# Patient Record
Sex: FEMALE | Race: WHITE | NOT HISPANIC OR LATINO
[De-identification: names, ages, dates, MRNs, and addresses within clinical notes are randomized per-mention and may not be internally consistent; named-entity substitution may affect disease eponyms.]

---

## 2021-11-28 ENCOUNTER — NON-APPOINTMENT (OUTPATIENT)
Age: 61
End: 2021-11-28

## 2021-11-29 ENCOUNTER — TRANSCRIPTION ENCOUNTER (OUTPATIENT)
Age: 61
End: 2021-11-29

## 2021-11-29 ENCOUNTER — APPOINTMENT (OUTPATIENT)
Dept: OBGYN | Facility: CLINIC | Age: 61
End: 2021-11-29
Payer: MEDICARE

## 2021-11-29 VITALS
TEMPERATURE: 97.9 F | DIASTOLIC BLOOD PRESSURE: 72 MMHG | HEART RATE: 79 BPM | BODY MASS INDEX: 23 KG/M2 | HEIGHT: 62 IN | WEIGHT: 125 LBS | SYSTOLIC BLOOD PRESSURE: 132 MMHG

## 2021-11-29 DIAGNOSIS — Z87.19 PERSONAL HISTORY OF OTHER DISEASES OF THE DIGESTIVE SYSTEM: ICD-10-CM

## 2021-11-29 DIAGNOSIS — Z78.9 OTHER SPECIFIED HEALTH STATUS: ICD-10-CM

## 2021-11-29 DIAGNOSIS — Z63.4 DISAPPEARANCE AND DEATH OF FAMILY MEMBER: ICD-10-CM

## 2021-11-29 DIAGNOSIS — E04.1 NONTOXIC SINGLE THYROID NODULE: ICD-10-CM

## 2021-11-29 DIAGNOSIS — Z80.0 FAMILY HISTORY OF MALIGNANT NEOPLASM OF DIGESTIVE ORGANS: ICD-10-CM

## 2021-11-29 DIAGNOSIS — K57.90 DIVERTICULOSIS OF INTESTINE, PART UNSPECIFIED, W/OUT PERFORATION OR ABSCESS W/OUT BLEEDING: ICD-10-CM

## 2021-11-29 DIAGNOSIS — Z86.19 PERSONAL HISTORY OF OTHER INFECTIOUS AND PARASITIC DISEASES: ICD-10-CM

## 2021-11-29 DIAGNOSIS — Z82.49 FAMILY HISTORY OF ISCHEMIC HEART DISEASE AND OTHER DISEASES OF THE CIRCULATORY SYSTEM: ICD-10-CM

## 2021-11-29 DIAGNOSIS — Z87.09 PERSONAL HISTORY OF OTHER DISEASES OF THE RESPIRATORY SYSTEM: ICD-10-CM

## 2021-11-29 PROBLEM — Z00.00 ENCOUNTER FOR PREVENTIVE HEALTH EXAMINATION: Status: ACTIVE | Noted: 2021-11-29

## 2021-11-29 PROCEDURE — 99386 PREV VISIT NEW AGE 40-64: CPT

## 2021-11-29 SDOH — SOCIAL STABILITY - SOCIAL INSECURITY: DISSAPEARANCE AND DEATH OF FAMILY MEMBER: Z63.4

## 2021-11-30 PROBLEM — Z78.9 CONSUMES ALCOHOL OCCASIONALLY: Status: ACTIVE | Noted: 2021-11-29

## 2021-11-30 PROBLEM — Z78.9 DOES NOT USE ILLICIT DRUGS: Status: ACTIVE | Noted: 2021-11-29

## 2021-11-30 PROBLEM — E04.1: Status: RESOLVED | Noted: 2021-11-30 | Resolved: 2021-11-30

## 2021-11-30 PROBLEM — Z87.09 HISTORY OF ASTHMA: Status: RESOLVED | Noted: 2021-11-29 | Resolved: 2021-11-30

## 2021-11-30 PROBLEM — Z78.9 DOES NOT USE TOBACCO: Status: ACTIVE | Noted: 2021-11-29

## 2021-11-30 PROBLEM — Z78.9 EXERCISES OCCASIONALLY: Status: ACTIVE | Noted: 2021-11-29

## 2021-11-30 PROBLEM — Z86.19 HISTORY OF SHINGLES: Status: RESOLVED | Noted: 2021-11-30 | Resolved: 2021-11-30

## 2021-11-30 PROBLEM — Z80.0 FAMILY HISTORY OF COLORECTAL CANCER: Status: ACTIVE | Noted: 2021-11-30

## 2021-11-30 PROBLEM — Z80.0 FAMILY HISTORY OF PANCREATIC CANCER: Status: ACTIVE | Noted: 2021-11-29

## 2021-11-30 PROBLEM — Z63.4 WIDOW: Status: ACTIVE | Noted: 2021-11-29

## 2021-11-30 PROBLEM — Z82.49 FAMILY HISTORY OF CARDIAC DISORDER: Status: ACTIVE | Noted: 2021-11-29

## 2021-11-30 PROBLEM — Z87.19 HISTORY OF DIVERTICULITIS OF COLON: Status: RESOLVED | Noted: 2021-11-30 | Resolved: 2021-11-30

## 2021-11-30 PROBLEM — K57.90 DIVERTICULOSIS: Status: RESOLVED | Noted: 2021-11-30 | Resolved: 2021-11-30

## 2021-11-30 PROBLEM — Z87.19 HISTORY OF GASTROESOPHAGEAL REFLUX (GERD): Status: RESOLVED | Noted: 2021-11-29 | Resolved: 2021-11-30

## 2021-11-30 RX ORDER — LEVOFLOXACIN 750 MG/1
750 TABLET, FILM COATED ORAL
Qty: 10 | Refills: 0 | Status: COMPLETED | COMMUNITY
Start: 2021-07-15

## 2021-11-30 RX ORDER — CLARITHROMYCIN 500 MG/1
500 TABLET, FILM COATED ORAL
Qty: 20 | Refills: 0 | Status: COMPLETED | COMMUNITY
Start: 2021-07-16

## 2021-11-30 NOTE — PHYSICAL EXAM
[Appropriately responsive] : appropriately responsive [Alert] : alert [No Acute Distress] : no acute distress [No Lymphadenopathy] : no lymphadenopathy [Regular Rate Rhythm] : regular rate rhythm [Soft] : soft [Non-tender] : non-tender [Non-distended] : non-distended [No Mass] : no mass [Oriented x3] : oriented x3 [Examination Of The Breasts] : a normal appearance [No Discharge] : no discharge [No Masses] : no breast masses were palpable [Vulvar Atrophy] : vulvar atrophy [No Lesions] : no lesions  [Labia Majora] : normal [Labia Minora] : normal [Normal] : normal [Atrophy] : atrophy [No Bleeding] : There was no active vaginal bleeding [Tenderness] : nontender [Enlarged ___ wks] : not enlarged [Mass ___ cm] : no uterine mass was palpated [Uterine Adnexae] : normal [FreeTextEntry5] : pap smear done

## 2021-11-30 NOTE — HISTORY OF PRESENT ILLNESS
[Patient reported mammogram was normal] : Patient reported mammogram was normal [Patient reported PAP Smear was normal] : Patient reported PAP Smear was normal [Patient reported colonoscopy was normal] : Patient reported colonoscopy was normal [Gonorrhea test offered] : Gonorrhea test offered [Chlamydia test offered] : Chlamydia test offered [Trichomonas test offered] : Trichomonas test offered [HPV test offered] : HPV test offered [postmenopausal] : postmenopausal [N] : Patient is not sexually active [Currently In Menopause] : currently in menopause [Menopause Age: ____] : age at menopause was [unfilled] [Previously active] : previously active [Men] : men [No] : No [Patient reported bone density results were abnormal] : Patient reported bone density results were abnormal [TextBox_4] : 60yo  in menopause came for annual GYN exam and pap smear.  She denies PMB, pelvic pain, discharge, dysuria or other GYN complaints. She does c/o feeling abdominal bloating generalized, no weight loss or N/V.  She states she thinks she had an abnormal pap and remembers having a colposcopy maybe in 2012, but was told normal and has been normal since.  She denies other STDs, fibroids or cysts. She is not sexually active.\par \par She has a FHx of pancreatic and colorectal cancer and was counseled on hereditary cancer gene testing.  All questions answered satisfactorily at and she states she will think about it.   [Mammogramdate] : 9-2020 [TextBox_19] : will give referral [PapSmeardate] : 2018 [BoneDensityDate] : 6-2021 [TextBox_37] : osteoporosis [ColonoscopyDate] : 2018 [TextBox_43] : 5 yr followup [PGHxTotal] : 4 [[facility]] : 2 [PGHxPremature] : 0 [PGHxAbortions] : 2 [[facility]Living] : 2 [PGHxABInduced] : 0 [PGHxABSpont] : 2 [PGHxEctopic] : 0 [PGHxMultBirths] : 0 [FreeTextEntry1] :  x2

## 2021-12-03 LAB
C TRACH RRNA SPEC QL NAA+PROBE: NOT DETECTED
HPV HIGH+LOW RISK DNA PNL CVX: NOT DETECTED
N GONORRHOEA RRNA SPEC QL NAA+PROBE: NOT DETECTED
SOURCE AMPLIFICATION: NORMAL
SOURCE AMPLIFICATION: NORMAL
T VAGINALIS RRNA SPEC QL NAA+PROBE: NOT DETECTED

## 2021-12-13 LAB — CYTOLOGY CVX/VAG DOC THIN PREP: ABNORMAL

## 2022-01-07 ENCOUNTER — APPOINTMENT (OUTPATIENT)
Dept: OBGYN | Facility: CLINIC | Age: 62
End: 2022-01-07
Payer: MEDICARE

## 2022-01-07 PROCEDURE — 99442: CPT

## 2022-01-26 ENCOUNTER — NON-APPOINTMENT (OUTPATIENT)
Age: 62
End: 2022-01-26

## 2022-01-31 ENCOUNTER — OUTPATIENT (OUTPATIENT)
Dept: OUTPATIENT SERVICES | Facility: HOSPITAL | Age: 62
LOS: 1 days | Discharge: HOME | End: 2022-01-31
Payer: MEDICARE

## 2022-01-31 DIAGNOSIS — R60.0 LOCALIZED EDEMA: ICD-10-CM

## 2022-01-31 PROCEDURE — 93971 EXTREMITY STUDY: CPT | Mod: 26,RT

## 2022-02-01 ENCOUNTER — OUTPATIENT (OUTPATIENT)
Dept: OUTPATIENT SERVICES | Facility: HOSPITAL | Age: 62
LOS: 1 days | Discharge: HOME | End: 2022-02-01
Payer: MEDICARE

## 2022-02-01 VITALS
RESPIRATION RATE: 16 BRPM | HEIGHT: 62 IN | HEART RATE: 52 BPM | DIASTOLIC BLOOD PRESSURE: 68 MMHG | OXYGEN SATURATION: 97 % | TEMPERATURE: 97 F | SYSTOLIC BLOOD PRESSURE: 139 MMHG | WEIGHT: 125 LBS

## 2022-02-01 DIAGNOSIS — S92.909A UNSPECIFIED FRACTURE OF UNSPECIFIED FOOT, INITIAL ENCOUNTER FOR CLOSED FRACTURE: Chronic | ICD-10-CM

## 2022-02-01 DIAGNOSIS — R93.89 ABNORMAL FINDINGS ON DIAGNOSTIC IMAGING OF OTHER SPECIFIED BODY STRUCTURES: ICD-10-CM

## 2022-02-01 DIAGNOSIS — Z98.890 OTHER SPECIFIED POSTPROCEDURAL STATES: Chronic | ICD-10-CM

## 2022-02-01 DIAGNOSIS — Z01.818 ENCOUNTER FOR OTHER PREPROCEDURAL EXAMINATION: ICD-10-CM

## 2022-02-01 LAB
ALBUMIN SERPL ELPH-MCNC: 4.4 G/DL — SIGNIFICANT CHANGE UP (ref 3.5–5.2)
ALP SERPL-CCNC: 87 U/L — SIGNIFICANT CHANGE UP (ref 30–115)
ALT FLD-CCNC: 9 U/L — SIGNIFICANT CHANGE UP (ref 0–41)
ANION GAP SERPL CALC-SCNC: 14 MMOL/L — SIGNIFICANT CHANGE UP (ref 7–14)
APTT BLD: 30 SEC — SIGNIFICANT CHANGE UP (ref 27–39.2)
AST SERPL-CCNC: 14 U/L — SIGNIFICANT CHANGE UP (ref 0–41)
BASOPHILS # BLD AUTO: 0.01 K/UL — SIGNIFICANT CHANGE UP (ref 0–0.2)
BASOPHILS NFR BLD AUTO: 0.1 % — SIGNIFICANT CHANGE UP (ref 0–1)
BILIRUB SERPL-MCNC: 0.5 MG/DL — SIGNIFICANT CHANGE UP (ref 0.2–1.2)
BUN SERPL-MCNC: 11 MG/DL — SIGNIFICANT CHANGE UP (ref 10–20)
CALCIUM SERPL-MCNC: 9.2 MG/DL — SIGNIFICANT CHANGE UP (ref 8.5–10.1)
CHLORIDE SERPL-SCNC: 104 MMOL/L — SIGNIFICANT CHANGE UP (ref 98–110)
CO2 SERPL-SCNC: 24 MMOL/L — SIGNIFICANT CHANGE UP (ref 17–32)
CREAT SERPL-MCNC: 0.9 MG/DL — SIGNIFICANT CHANGE UP (ref 0.7–1.5)
EOSINOPHIL # BLD AUTO: 0.1 K/UL — SIGNIFICANT CHANGE UP (ref 0–0.7)
EOSINOPHIL NFR BLD AUTO: 1.3 % — SIGNIFICANT CHANGE UP (ref 0–8)
GLUCOSE SERPL-MCNC: 77 MG/DL — SIGNIFICANT CHANGE UP (ref 70–99)
HCT VFR BLD CALC: 36 % — LOW (ref 37–47)
HGB BLD-MCNC: 12.8 G/DL — SIGNIFICANT CHANGE UP (ref 12–16)
IMM GRANULOCYTES NFR BLD AUTO: 0.3 % — SIGNIFICANT CHANGE UP (ref 0.1–0.3)
INR BLD: 1.03 RATIO — SIGNIFICANT CHANGE UP (ref 0.65–1.3)
LYMPHOCYTES # BLD AUTO: 2.24 K/UL — SIGNIFICANT CHANGE UP (ref 1.2–3.4)
LYMPHOCYTES # BLD AUTO: 29.6 % — SIGNIFICANT CHANGE UP (ref 20.5–51.1)
MCHC RBC-ENTMCNC: 32.9 PG — HIGH (ref 27–31)
MCHC RBC-ENTMCNC: 35.6 G/DL — SIGNIFICANT CHANGE UP (ref 32–37)
MCV RBC AUTO: 92.5 FL — SIGNIFICANT CHANGE UP (ref 81–99)
MONOCYTES # BLD AUTO: 0.88 K/UL — HIGH (ref 0.1–0.6)
MONOCYTES NFR BLD AUTO: 11.6 % — HIGH (ref 1.7–9.3)
NEUTROPHILS # BLD AUTO: 4.31 K/UL — SIGNIFICANT CHANGE UP (ref 1.4–6.5)
NEUTROPHILS NFR BLD AUTO: 57.1 % — SIGNIFICANT CHANGE UP (ref 42.2–75.2)
NRBC # BLD: 0 /100 WBCS — SIGNIFICANT CHANGE UP (ref 0–0)
PLATELET # BLD AUTO: 254 K/UL — SIGNIFICANT CHANGE UP (ref 130–400)
POTASSIUM SERPL-MCNC: 3.6 MMOL/L — SIGNIFICANT CHANGE UP (ref 3.5–5)
POTASSIUM SERPL-SCNC: 3.6 MMOL/L — SIGNIFICANT CHANGE UP (ref 3.5–5)
PROT SERPL-MCNC: 6.6 G/DL — SIGNIFICANT CHANGE UP (ref 6–8)
PROTHROM AB SERPL-ACNC: 11.8 SEC — SIGNIFICANT CHANGE UP (ref 9.95–12.87)
RBC # BLD: 3.89 M/UL — LOW (ref 4.2–5.4)
RBC # FLD: 12.6 % — SIGNIFICANT CHANGE UP (ref 11.5–14.5)
SODIUM SERPL-SCNC: 142 MMOL/L — SIGNIFICANT CHANGE UP (ref 135–146)
T3 SERPL-MCNC: 205 NG/DL — HIGH (ref 80–200)
T4 AB SER-ACNC: 6.2 UG/DL — SIGNIFICANT CHANGE UP (ref 4.6–12)
TSH SERPL-MCNC: 16.24 UIU/ML — HIGH (ref 0.27–4.2)
WBC # BLD: 7.56 K/UL — SIGNIFICANT CHANGE UP (ref 4.8–10.8)
WBC # FLD AUTO: 7.56 K/UL — SIGNIFICANT CHANGE UP (ref 4.8–10.8)

## 2022-02-01 PROCEDURE — 93010 ELECTROCARDIOGRAM REPORT: CPT

## 2022-02-01 NOTE — H&P PST ADULT - NSICDXFAMILYHX_GEN_ALL_CORE_FT
FAMILY HISTORY:  Father  Still living? No  FH: CAD (coronary artery disease), Age at diagnosis: Age Unknown    Mother  Still living? No  FHx: pancreatic cancer, Age at diagnosis: Age Unknown

## 2022-02-01 NOTE — H&P PST ADULT - NSICDXPASTMEDICALHX_GEN_ALL_CORE_FT
PAST MEDICAL HISTORY:  Asthma stable, last pneumonia 2020    Cardiac murmur     Dystrophy, reflex sympathetic of upper limb (RSD)     Fibromyalgia     GERD (gastroesophageal reflux disease)     History of diverticulosis     HTN (hypertension)     Hyperthyroidism     Muscle tension dysphonia     OA (osteoarthritis)

## 2022-02-01 NOTE — H&P PST ADULT - BRAND OF FIRST COVID-19 BOOSTER
The patient has multiple chronic GI and non GI complaints.  The constellation of her GI symptoms are consistent with IBS.  Her left upper quadrant pain appears more musculoskeletal in nature and could be due to persistent issues with exercising and putting stress on this area from crunches, etc.  I also explained to her that chronic nausea is also a very vague symptoms that may or may not be related GI causes, however with that said given her iron deficiency anemia as well as her multiple symptoms I do believe that she warrants EGD and colonoscopy.  We will try to improve her constipation with fiber and MiraLax.  If this does not work in the next few weeks we can consider a trial of Trulance or Linzess.  She already has an extensive negative serologic evaluation so I will hold off on any more blood work at this time.  She had a negative abdominal ultrasound.  I do think dietary modification would be beneficial to her, especially stay away from certain foods that cause bloating and gas.  If the above workup is negative we may consider MRI of the brain given her multiple neurologic issues as this can also be associated with chronic nausea as well. Moderna

## 2022-02-01 NOTE — H&P PST ADULT - HISTORY OF PRESENT ILLNESS
60 yo female presents for PAST in preparation for Dilation and Curettage, Diagnostic Hysteroscopy  Pt complains of abnormal abdominal bloating and cramps. Pt had intravaginal sono with "thickened walls" , pt is post menopausal, denies vaginal bleeding or discharge.  Denies any chest pain, difficulty breathing, SOB, palpitations, dysuria, URI, or any other infections in the last 2 weeks/1 month. Denies any recent travel, contact, or exposure to any persons with known or suspected COVID-19. Pt also denies COVID testing within the last 2 weeks. Pt advised to self quarantine until day of procedure. Exercise tolerance of 2-3 flights of stairs without dyspnea. ROBIN reviewed with patient.  Anesthesia Alert  NO--Difficult Airway  NO--History of neck surgery or radiation  NO--Limited ROM of neck  NO--History of Malignant hyperthermia  NO--Personal or family history of Pseudocholinesterase deficiency.  NO--Prior Anesthesia Complication  NO--Latex Allergy?? adhesives   NO--Loose teeth  NO--History of Rheumatoid Arthritis  NO--ROBIN  NO--Bleeding risk  NO--Other_____   written and verbal instructions with teach back on chlorhexidine shampoo provided,  pt verbalized understanding with returned demonstration  Patient verbalized understanding of instructions and was given the opportunity to ask questions and have them answered.   60 yo female presents for PAST in preparation for Dilation and Curettage, Diagnostic Hysteroscopy  Pt complains of intermittent abnormal abdominal bloating and cramps  for months. Pain is rated as 5/10, pt would take Ibuprofen with relief.  Pt had intravaginal sono and diagnosed with "thickened walls" , pt is post menopausal, LMP was in last 40's.    denies vaginal bleeding or discharge.  Denies any chest pain, difficulty breathing, SOB, palpitations, dysuria, URI, or any other infections in the last 2 weeks/1 month. Denies any recent travel, contact, or exposure to any persons with known or suspected COVID-19. Pt also denies COVID testing within the last 2 weeks. Pt advised to self quarantine until day of procedure. Exercise tolerance of 2-3 flights of stairs without dyspnea. ROBIN reviewed with patient.  Anesthesia Alert  NO--Difficult Airway  NO--History of neck surgery or radiation  NO--Limited ROM of neck  NO--History of Malignant hyperthermia  NO--Personal or family history of Pseudocholinesterase deficiency.  NO--Prior Anesthesia Complication  NO--Latex Allergy?? adhesives   NO--Loose teeth  NO--History of Rheumatoid Arthritis  NO--ROBIN  NO--Bleeding risk  NO--Other_____   written and verbal instructions with teach back on chlorhexidine shampoo provided,  pt verbalized understanding with returned demonstration  Patient verbalized understanding of instructions and was given the opportunity to ask questions and have them answered.

## 2022-02-07 ENCOUNTER — NON-APPOINTMENT (OUTPATIENT)
Age: 62
End: 2022-02-07

## 2022-02-07 ENCOUNTER — LABORATORY RESULT (OUTPATIENT)
Age: 62
End: 2022-02-07

## 2022-02-08 ENCOUNTER — OUTPATIENT (OUTPATIENT)
Dept: OUTPATIENT SERVICES | Facility: HOSPITAL | Age: 62
LOS: 1 days | Discharge: HOME | End: 2022-02-08
Payer: MEDICARE

## 2022-02-08 DIAGNOSIS — S92.909A UNSPECIFIED FRACTURE OF UNSPECIFIED FOOT, INITIAL ENCOUNTER FOR CLOSED FRACTURE: Chronic | ICD-10-CM

## 2022-02-08 DIAGNOSIS — M79.671 PAIN IN RIGHT FOOT: ICD-10-CM

## 2022-02-08 DIAGNOSIS — M25.571 PAIN IN RIGHT ANKLE AND JOINTS OF RIGHT FOOT: ICD-10-CM

## 2022-02-08 DIAGNOSIS — Z98.890 OTHER SPECIFIED POSTPROCEDURAL STATES: Chronic | ICD-10-CM

## 2022-02-08 PROBLEM — K21.9 GASTRO-ESOPHAGEAL REFLUX DISEASE WITHOUT ESOPHAGITIS: Chronic | Status: ACTIVE | Noted: 2022-02-01

## 2022-02-08 PROBLEM — R01.1 CARDIAC MURMUR, UNSPECIFIED: Chronic | Status: ACTIVE | Noted: 2022-02-01

## 2022-02-08 PROBLEM — M79.7 FIBROMYALGIA: Chronic | Status: ACTIVE | Noted: 2022-02-01

## 2022-02-08 PROBLEM — R49.0 DYSPHONIA: Chronic | Status: ACTIVE | Noted: 2022-02-01

## 2022-02-08 PROBLEM — I10 ESSENTIAL (PRIMARY) HYPERTENSION: Chronic | Status: ACTIVE | Noted: 2022-02-01

## 2022-02-08 PROBLEM — E05.90 THYROTOXICOSIS, UNSPECIFIED WITHOUT THYROTOXIC CRISIS OR STORM: Chronic | Status: ACTIVE | Noted: 2022-02-01

## 2022-02-08 PROBLEM — Z87.19 PERSONAL HISTORY OF OTHER DISEASES OF THE DIGESTIVE SYSTEM: Chronic | Status: ACTIVE | Noted: 2022-02-01

## 2022-02-08 PROBLEM — M19.90 UNSPECIFIED OSTEOARTHRITIS, UNSPECIFIED SITE: Chronic | Status: ACTIVE | Noted: 2022-02-01

## 2022-02-08 PROCEDURE — 73630 X-RAY EXAM OF FOOT: CPT | Mod: 26,RT

## 2022-02-08 PROCEDURE — 73610 X-RAY EXAM OF ANKLE: CPT | Mod: 26,RT

## 2022-02-28 ENCOUNTER — APPOINTMENT (OUTPATIENT)
Dept: OBGYN | Facility: CLINIC | Age: 62
End: 2022-02-28

## 2022-03-03 ENCOUNTER — NON-APPOINTMENT (OUTPATIENT)
Age: 62
End: 2022-03-03

## 2022-03-10 ENCOUNTER — OUTPATIENT (OUTPATIENT)
Dept: OUTPATIENT SERVICES | Facility: HOSPITAL | Age: 62
LOS: 1 days | Discharge: HOME | End: 2022-03-10

## 2022-03-10 VITALS
SYSTOLIC BLOOD PRESSURE: 154 MMHG | RESPIRATION RATE: 17 BRPM | WEIGHT: 128.09 LBS | HEIGHT: 62 IN | HEART RATE: 56 BPM | DIASTOLIC BLOOD PRESSURE: 74 MMHG | OXYGEN SATURATION: 97 % | TEMPERATURE: 97 F

## 2022-03-10 DIAGNOSIS — R93.89 ABNORMAL FINDINGS ON DIAGNOSTIC IMAGING OF OTHER SPECIFIED BODY STRUCTURES: ICD-10-CM

## 2022-03-10 DIAGNOSIS — Z98.890 OTHER SPECIFIED POSTPROCEDURAL STATES: Chronic | ICD-10-CM

## 2022-03-10 DIAGNOSIS — Z01.818 ENCOUNTER FOR OTHER PREPROCEDURAL EXAMINATION: ICD-10-CM

## 2022-03-10 DIAGNOSIS — S92.909A UNSPECIFIED FRACTURE OF UNSPECIFIED FOOT, INITIAL ENCOUNTER FOR CLOSED FRACTURE: Chronic | ICD-10-CM

## 2022-03-10 LAB
ALBUMIN SERPL ELPH-MCNC: 4.3 G/DL — SIGNIFICANT CHANGE UP (ref 3.5–5.2)
ALP SERPL-CCNC: 82 U/L — SIGNIFICANT CHANGE UP (ref 30–115)
ALT FLD-CCNC: 16 U/L — SIGNIFICANT CHANGE UP (ref 0–41)
ANION GAP SERPL CALC-SCNC: 14 MMOL/L — SIGNIFICANT CHANGE UP (ref 7–14)
APTT BLD: 29.4 SEC — SIGNIFICANT CHANGE UP (ref 27–39.2)
AST SERPL-CCNC: 20 U/L — SIGNIFICANT CHANGE UP (ref 0–41)
BASOPHILS # BLD AUTO: 0 K/UL — SIGNIFICANT CHANGE UP (ref 0–0.2)
BASOPHILS NFR BLD AUTO: 0 % — SIGNIFICANT CHANGE UP (ref 0–1)
BILIRUB SERPL-MCNC: 0.2 MG/DL — SIGNIFICANT CHANGE UP (ref 0.2–1.2)
BUN SERPL-MCNC: 11 MG/DL — SIGNIFICANT CHANGE UP (ref 10–20)
CALCIUM SERPL-MCNC: 8.6 MG/DL — SIGNIFICANT CHANGE UP (ref 8.5–10.1)
CHLORIDE SERPL-SCNC: 101 MMOL/L — SIGNIFICANT CHANGE UP (ref 98–110)
CO2 SERPL-SCNC: 22 MMOL/L — SIGNIFICANT CHANGE UP (ref 17–32)
CREAT SERPL-MCNC: 1 MG/DL — SIGNIFICANT CHANGE UP (ref 0.7–1.5)
EGFR: 64 ML/MIN/1.73M2 — SIGNIFICANT CHANGE UP
EOSINOPHIL # BLD AUTO: 0.12 K/UL — SIGNIFICANT CHANGE UP (ref 0–0.7)
EOSINOPHIL NFR BLD AUTO: 1.7 % — SIGNIFICANT CHANGE UP (ref 0–8)
GLUCOSE SERPL-MCNC: 94 MG/DL — SIGNIFICANT CHANGE UP (ref 70–99)
HCT VFR BLD CALC: 34.3 % — LOW (ref 37–47)
HGB BLD-MCNC: 11.8 G/DL — LOW (ref 12–16)
IMM GRANULOCYTES NFR BLD AUTO: 0.4 % — HIGH (ref 0.1–0.3)
INR BLD: 1.07 RATIO — SIGNIFICANT CHANGE UP (ref 0.65–1.3)
LYMPHOCYTES # BLD AUTO: 2.21 K/UL — SIGNIFICANT CHANGE UP (ref 1.2–3.4)
LYMPHOCYTES # BLD AUTO: 30.4 % — SIGNIFICANT CHANGE UP (ref 20.5–51.1)
MCHC RBC-ENTMCNC: 32.3 PG — HIGH (ref 27–31)
MCHC RBC-ENTMCNC: 34.4 G/DL — SIGNIFICANT CHANGE UP (ref 32–37)
MCV RBC AUTO: 94 FL — SIGNIFICANT CHANGE UP (ref 81–99)
MONOCYTES # BLD AUTO: 0.73 K/UL — HIGH (ref 0.1–0.6)
MONOCYTES NFR BLD AUTO: 10.1 % — HIGH (ref 1.7–9.3)
NEUTROPHILS # BLD AUTO: 4.17 K/UL — SIGNIFICANT CHANGE UP (ref 1.4–6.5)
NEUTROPHILS NFR BLD AUTO: 57.4 % — SIGNIFICANT CHANGE UP (ref 42.2–75.2)
NRBC # BLD: 0 /100 WBCS — SIGNIFICANT CHANGE UP (ref 0–0)
PLATELET # BLD AUTO: 269 K/UL — SIGNIFICANT CHANGE UP (ref 130–400)
POTASSIUM SERPL-MCNC: 3.8 MMOL/L — SIGNIFICANT CHANGE UP (ref 3.5–5)
POTASSIUM SERPL-SCNC: 3.8 MMOL/L — SIGNIFICANT CHANGE UP (ref 3.5–5)
PROT SERPL-MCNC: 6.4 G/DL — SIGNIFICANT CHANGE UP (ref 6–8)
PROTHROM AB SERPL-ACNC: 12.3 SEC — SIGNIFICANT CHANGE UP (ref 9.95–12.87)
RBC # BLD: 3.65 M/UL — LOW (ref 4.2–5.4)
RBC # FLD: 12.2 % — SIGNIFICANT CHANGE UP (ref 11.5–14.5)
SODIUM SERPL-SCNC: 137 MMOL/L — SIGNIFICANT CHANGE UP (ref 135–146)
T3 SERPL-MCNC: 222 NG/DL — HIGH (ref 80–200)
T4 AB SER-ACNC: 10.5 UG/DL — SIGNIFICANT CHANGE UP (ref 4.6–12)
TSH SERPL-MCNC: 0.14 UIU/ML — LOW (ref 0.27–4.2)
WBC # BLD: 7.26 K/UL — SIGNIFICANT CHANGE UP (ref 4.8–10.8)
WBC # FLD AUTO: 7.26 K/UL — SIGNIFICANT CHANGE UP (ref 4.8–10.8)

## 2022-03-10 RX ORDER — CYCLOBENZAPRINE HYDROCHLORIDE 10 MG/1
0 TABLET, FILM COATED ORAL
Qty: 0 | Refills: 0 | DISCHARGE

## 2022-03-10 RX ORDER — MOMETASONE FUROATE 220 UG/1
0 INHALANT RESPIRATORY (INHALATION)
Qty: 0 | Refills: 0 | DISCHARGE

## 2022-03-10 RX ORDER — METHIMAZOLE 10 MG/1
1 TABLET ORAL
Qty: 0 | Refills: 0 | DISCHARGE

## 2022-03-10 RX ORDER — AMITRIPTYLINE HCL 25 MG
0 TABLET ORAL
Qty: 0 | Refills: 0 | DISCHARGE

## 2022-03-10 NOTE — H&P PST ADULT - ATTENDING COMMENTS
60yo P2 in menopause came for scheduled D&C hysteroscopy.  She has been having increased abdominal bloating and cramps for a few months and had pelvic sonogram that showed thickened endometrium with presumed polyp with some vascularity. She denies Pelvic pain or PMB.  We has discussed all of her options for mgmt, but she declined further work up and wanted more diagnostic and definitive mgmt with procedure. We previously and again today discussed R/B/A and patient wishes to proceed with procedure. She went for medical clearance and the records are in the chart.  This procedure was delayed after she had an infection that was being treated.  She is recovered and has no complaints today. Vitals stable.    Risks of surgery including but not limited to, hemorrhage, blood transfusion, uterine perforation, injury to bowel/bladder with repair, need for laparoscopy/laparotomy, cystoscopy, future procedures and readmission were discussed. Patient understood all counseling, all questions answered and informed witnessed consent was signed.     A: 60yo in menopause with thickened endometrium and possible polyp for D&C hysteroscopy    P: admit     NPO     IV hydration     medical clearance in chart      anesthesia consulted     venodyne compression     on call to OR     anticipate d/c home and 2 wk office follow up or PRN

## 2022-03-10 NOTE — H&P PST ADULT - REASON FOR ADMISSION
Case Type: OP Block TimeSuite: CASProceduralist: Rosalind Sanchez  Confirmed Surgery DateTime: [id number] - Procedure: Dilation and Curettage, Diagnostic Hysteroscopy  Laterality: N/ALength of Procedure: 60 MinutesAnesthesia Type: General

## 2022-03-10 NOTE — H&P PST ADULT - HISTORY OF PRESENT ILLNESS
62 yo female presents for PAST in preparation for Dilation and Curettage, Diagnostic Hysteroscopy  Pt complains of intermittent abnormal abdominal bloating and cramps  for months. Pain is rated as 5/10, pt would take Ibuprofen with relief.  Pt had intravaginal sono and diagnosed with "thickened walls", as well as a "structure" , pt is post menopausal, LMP was in last 40's.    denies vaginal bleeding or discharge.    SURGERY WAS POSTPONED, BECAUSE HER TFT'S IN PAST REVEALED SHE WAS HYPOTHYROID.    Denies any chest pain, difficulty breathing, SOB, palpitations, dysuria, URI, or any other infections in the last 2 weeks/1 month. Denies any recent travel, contact, or exposure to any persons with known or suspected COVID-19. Pt also denies COVID testing within the last 2 weeks. Pt advised to self quarantine until day of procedure. Exercise tolerance of 2-3 flights of stairs without dyspnea. ROBIN reviewed with patient.    Anesthesia Alert  NO--Difficult Airway  NO--History of neck surgery or radiation  NO--Limited ROM of neck  NO--History of Malignant hyperthermia  NO--Personal or family history of Pseudocholinesterase deficiency.  NO--Prior Anesthesia Complication  NO--Latex Allergy?? adhesives   NO--Loose teeth  NO--History of Rheumatoid Arthritis  NO--ROBIN  NO--Bleeding risk   written and verbal instructions with teach back on chlorhexidine shampoo provided,  pt verbalized understanding with returned demonstration  Patient verbalized understanding of instructions and was given the opportunity to ask questions and have them answered.

## 2022-03-13 ENCOUNTER — OUTPATIENT (OUTPATIENT)
Dept: OUTPATIENT SERVICES | Facility: HOSPITAL | Age: 62
LOS: 1 days | Discharge: HOME | End: 2022-03-13
Payer: MEDICARE

## 2022-03-13 DIAGNOSIS — S92.909A UNSPECIFIED FRACTURE OF UNSPECIFIED FOOT, INITIAL ENCOUNTER FOR CLOSED FRACTURE: Chronic | ICD-10-CM

## 2022-03-13 DIAGNOSIS — M79.671 PAIN IN RIGHT FOOT: ICD-10-CM

## 2022-03-13 DIAGNOSIS — Z98.890 OTHER SPECIFIED POSTPROCEDURAL STATES: Chronic | ICD-10-CM

## 2022-03-13 DIAGNOSIS — M25.571 PAIN IN RIGHT ANKLE AND JOINTS OF RIGHT FOOT: ICD-10-CM

## 2022-03-13 PROCEDURE — 73721 MRI JNT OF LWR EXTRE W/O DYE: CPT | Mod: 26,RT

## 2022-03-13 PROCEDURE — 73718 MRI LOWER EXTREMITY W/O DYE: CPT | Mod: 26,RT

## 2022-03-14 ENCOUNTER — LABORATORY RESULT (OUTPATIENT)
Age: 62
End: 2022-03-14

## 2022-03-16 NOTE — ASU PATIENT PROFILE, ADULT - NSICDXPASTMEDICALHX_GEN_ALL_CORE_FT
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

## 2022-03-17 ENCOUNTER — RESULT REVIEW (OUTPATIENT)
Age: 62
End: 2022-03-17

## 2022-03-17 ENCOUNTER — OUTPATIENT (OUTPATIENT)
Dept: OUTPATIENT SERVICES | Facility: HOSPITAL | Age: 62
LOS: 1 days | Discharge: HOME | End: 2022-03-17
Payer: MEDICARE

## 2022-03-17 VITALS
WEIGHT: 128.09 LBS | OXYGEN SATURATION: 98 % | TEMPERATURE: 98 F | DIASTOLIC BLOOD PRESSURE: 59 MMHG | HEART RATE: 49 BPM | SYSTOLIC BLOOD PRESSURE: 120 MMHG | HEIGHT: 62 IN | RESPIRATION RATE: 20 BRPM

## 2022-03-17 VITALS
DIASTOLIC BLOOD PRESSURE: 73 MMHG | SYSTOLIC BLOOD PRESSURE: 112 MMHG | HEART RATE: 50 BPM | RESPIRATION RATE: 20 BRPM | OXYGEN SATURATION: 97 %

## 2022-03-17 DIAGNOSIS — Z98.890 OTHER SPECIFIED POSTPROCEDURAL STATES: Chronic | ICD-10-CM

## 2022-03-17 DIAGNOSIS — S92.909A UNSPECIFIED FRACTURE OF UNSPECIFIED FOOT, INITIAL ENCOUNTER FOR CLOSED FRACTURE: Chronic | ICD-10-CM

## 2022-03-17 PROCEDURE — 58120 DILATION AND CURETTAGE: CPT

## 2022-03-17 PROCEDURE — 88305 TISSUE EXAM BY PATHOLOGIST: CPT | Mod: 26

## 2022-03-17 RX ORDER — ONDANSETRON 8 MG/1
4 TABLET, FILM COATED ORAL ONCE
Refills: 0 | Status: DISCONTINUED | OUTPATIENT
Start: 2022-03-17 | End: 2022-03-31

## 2022-03-17 RX ORDER — MOMETASONE FUROATE 220 UG/1
2 INHALANT RESPIRATORY (INHALATION)
Qty: 0 | Refills: 0 | DISCHARGE

## 2022-03-17 RX ORDER — ACETAMINOPHEN 500 MG
650 TABLET ORAL ONCE
Refills: 0 | Status: DISCONTINUED | OUTPATIENT
Start: 2022-03-17 | End: 2022-03-31

## 2022-03-17 RX ORDER — METOPROLOL TARTRATE 50 MG
1 TABLET ORAL
Qty: 0 | Refills: 0 | DISCHARGE

## 2022-03-17 RX ORDER — OMEPRAZOLE 10 MG/1
1 CAPSULE, DELAYED RELEASE ORAL
Qty: 0 | Refills: 0 | DISCHARGE

## 2022-03-17 RX ORDER — DILTIAZEM HCL 120 MG
1 CAPSULE, EXT RELEASE 24 HR ORAL
Qty: 0 | Refills: 0 | DISCHARGE

## 2022-03-17 RX ORDER — SODIUM CHLORIDE 9 MG/ML
1000 INJECTION, SOLUTION INTRAVENOUS
Refills: 0 | Status: DISCONTINUED | OUTPATIENT
Start: 2022-03-17 | End: 2022-03-31

## 2022-03-17 RX ORDER — MONTELUKAST 4 MG/1
1 TABLET, CHEWABLE ORAL
Qty: 0 | Refills: 0 | DISCHARGE

## 2022-03-17 RX ORDER — HYDROMORPHONE HYDROCHLORIDE 2 MG/ML
0.25 INJECTION INTRAMUSCULAR; INTRAVENOUS; SUBCUTANEOUS
Refills: 0 | Status: DISCONTINUED | OUTPATIENT
Start: 2022-03-17 | End: 2022-03-17

## 2022-03-17 RX ORDER — MELOXICAM 15 MG/1
1 TABLET ORAL
Qty: 0 | Refills: 0 | DISCHARGE

## 2022-03-17 RX ORDER — METHIMAZOLE 10 MG/1
1 TABLET ORAL
Qty: 0 | Refills: 0 | DISCHARGE

## 2022-03-17 RX ADMIN — SODIUM CHLORIDE 100 MILLILITER(S): 9 INJECTION, SOLUTION INTRAVENOUS at 09:54

## 2022-03-17 NOTE — BRIEF OPERATIVE NOTE - NSICDXBRIEFPROCEDURE_GEN_ALL_CORE_FT
PROCEDURES:  Diagnostic hysteroscopy 17-Mar-2022 10:00:50  Yamini Myles  Dilation and curettage, uterus 17-Mar-2022 10:00:59  Yamini Myles  Polypectomy, uterus 17-Mar-2022 10:01:07  Yamini Myles

## 2022-03-17 NOTE — BRIEF OPERATIVE NOTE - OPERATION/FINDINGS
EUA revealed an antevered 6 week sized uterus, no masses in cervix or adnexa. Diagnostic hysteroscopy was performed revealing a right tubal ostial polyp and fundal polyp, excised with use of hysteroscopic forceps. Bilateral tubal ostia visualized. Otherwise grossly normal uterine cavity. EUA revealed an antevered 6 week sized uterus, no masses in cervix or adnexa. Diagnostic hysteroscopy was performed revealing a right tubal ostial polyp and fundal polyp, excised with use of hysteroscopic forceps. Bilateral tubal ostia visualized. Otherwise grossly normal uterine cavity.    Before procedure was started, left sided thigh mass noted, soft, nonfluctuant, with no erythema or bruising, measuring about 3x3 cm EUA revealed an antevered 6 week sized uterus, no masses in cervix or adnexa. Diagnostic hysteroscopy was performed revealing a right tubal ostial polyp and fundal polyp, excised with use of hysteroscopic forceps. Bilateral tubal ostia visualized. Otherwise grossly normal uterine cavity.    Before procedure was started, left sided inner thigh mass/swelling noted, soft, nonfluctuant, with no erythema or bruising, measuring about 5x3 cm

## 2022-03-17 NOTE — BRIEF OPERATIVE NOTE - NSICDXBRIEFPREOP_GEN_ALL_CORE_FT
PRE-OP DIAGNOSIS:  Abdominal bloating 17-Mar-2022 10:01:20  Yamini Myles  Postmenopausal 17-Mar-2022 10:01:32  Yamini Myles  Thickened endometrium 17-Mar-2022 10:01:43  Yamini Myles  AP (abdominal pain) 17-Mar-2022 10:02:10  Yamini Myles

## 2022-03-17 NOTE — ASU DISCHARGE PLAN (ADULT/PEDIATRIC) - CARE PROVIDER_API CALL
Rosalind Sanchez)  Obstetrics and Gynecology  [facility] [facility], [address]  [place] [address]  Phone: ()-  Fax: ()-  Follow Up Time: 2 weeks

## 2022-03-17 NOTE — ASU DISCHARGE PLAN (ADULT/PEDIATRIC) - NS MD DC FALL RISK RISK
For information on Fall & Injury Prevention, visit: [email or web address] OR  [email or web address] OR  https://www.cdc.gov/steadi/patient.html

## 2022-03-17 NOTE — CHART NOTE - NSCHARTNOTEFT_GEN_A_CORE
PACU ANESTHESIA ADMISSION NOTE      Procedure: hysteroscopy, dilation and curettage  Post op diagnosis:  endometrial polyp    _x___  Patent Airway    __x__  Full return of protective reflexes    _x___  Full recovery from anesthesia / back to baseline status    Vitals:  T(C): 97.5 F  HR: 52  BP: 116/66  RR: 16  SpO2: 99%    Mental Status:  __x__ Awake   ___x__ Alert   _____ Drowsy   _____ Sedated    Nausea/Vomiting:  _x___ NO  ______Yes,   See Post - Op Orders          Pain Scale (0-10):  _____    Treatment: ____ None    __x__ See Post - Op/PCA Orders    Post - Operative Fluids:   ____ Oral   __x__ See Post - Op Orders    Plan: Discharge:   _x___Home       _____Floor     _____Critical Care    _____  Other:_________________    Comments: uneventful anesthesia course no complications. Vitals stable. Pt transferred to PACU. Discharge to home when criteria is met

## 2022-03-18 LAB — SURGICAL PATHOLOGY STUDY: SIGNIFICANT CHANGE UP

## 2022-03-21 PROBLEM — G90.519 COMPLEX REGIONAL PAIN SYNDROME I OF UNSPECIFIED UPPER LIMB: Chronic | Status: ACTIVE | Noted: 2022-02-01

## 2022-03-21 PROBLEM — J18.9 PNEUMONIA, UNSPECIFIED ORGANISM: Chronic | Status: ACTIVE | Noted: 2022-03-10

## 2022-03-21 PROBLEM — J45.909 UNSPECIFIED ASTHMA, UNCOMPLICATED: Chronic | Status: ACTIVE | Noted: 2022-02-01

## 2022-03-22 DIAGNOSIS — R93.89 ABNORMAL FINDINGS ON DIAGNOSTIC IMAGING OF OTHER SPECIFIED BODY STRUCTURES: ICD-10-CM

## 2022-03-22 DIAGNOSIS — N84.0 POLYP OF CORPUS UTERI: ICD-10-CM

## 2022-03-22 DIAGNOSIS — R14.0 ABDOMINAL DISTENSION (GASEOUS): ICD-10-CM

## 2022-03-22 DIAGNOSIS — Z88.0 ALLERGY STATUS TO PENICILLIN: ICD-10-CM

## 2022-03-22 DIAGNOSIS — K21.9 GASTRO-ESOPHAGEAL REFLUX DISEASE WITHOUT ESOPHAGITIS: ICD-10-CM

## 2022-03-22 DIAGNOSIS — J45.909 UNSPECIFIED ASTHMA, UNCOMPLICATED: ICD-10-CM

## 2022-03-22 DIAGNOSIS — M19.90 UNSPECIFIED OSTEOARTHRITIS, UNSPECIFIED SITE: ICD-10-CM

## 2022-03-22 DIAGNOSIS — E05.90 THYROTOXICOSIS, UNSPECIFIED WITHOUT THYROTOXIC CRISIS OR STORM: ICD-10-CM

## 2022-03-22 DIAGNOSIS — R10.9 UNSPECIFIED ABDOMINAL PAIN: ICD-10-CM

## 2022-03-22 DIAGNOSIS — N85.4 MALPOSITION OF UTERUS: ICD-10-CM

## 2022-03-22 DIAGNOSIS — Z88.5 ALLERGY STATUS TO NARCOTIC AGENT: ICD-10-CM

## 2022-03-22 DIAGNOSIS — I10 ESSENTIAL (PRIMARY) HYPERTENSION: ICD-10-CM

## 2022-03-30 ENCOUNTER — NON-APPOINTMENT (OUTPATIENT)
Age: 62
End: 2022-03-30

## 2022-04-04 ENCOUNTER — APPOINTMENT (OUTPATIENT)
Dept: OBGYN | Facility: CLINIC | Age: 62
End: 2022-04-04
Payer: MEDICARE

## 2022-04-04 VITALS
BODY MASS INDEX: 23.92 KG/M2 | DIASTOLIC BLOOD PRESSURE: 78 MMHG | SYSTOLIC BLOOD PRESSURE: 128 MMHG | HEIGHT: 62 IN | WEIGHT: 130 LBS | TEMPERATURE: 97.7 F | HEART RATE: 78 BPM

## 2022-04-04 LAB
BILIRUB UR QL STRIP: NORMAL
CLARITY UR: CLEAR
COLLECTION METHOD: NORMAL
GLUCOSE UR-MCNC: NORMAL
HCG UR QL: 0.2 EU/DL
HGB UR QL STRIP.AUTO: NORMAL
KETONES UR-MCNC: NORMAL
LEUKOCYTE ESTERASE UR QL STRIP: NORMAL
NITRITE UR QL STRIP: NORMAL
PH UR STRIP: 5
PROT UR STRIP-MCNC: NORMAL
SP GR UR STRIP: 1

## 2022-04-04 PROCEDURE — 81003 URINALYSIS AUTO W/O SCOPE: CPT | Mod: QW

## 2022-04-04 PROCEDURE — 99213 OFFICE O/P EST LOW 20 MIN: CPT

## 2022-04-04 RX ORDER — CLINDAMYCIN HYDROCHLORIDE 300 MG/1
300 CAPSULE ORAL
Qty: 30 | Refills: 0 | Status: COMPLETED | COMMUNITY
Start: 2022-02-08

## 2022-04-04 RX ORDER — SULFAMETHOXAZOLE AND TRIMETHOPRIM 800; 160 MG/1; MG/1
800-160 TABLET ORAL
Qty: 20 | Refills: 0 | Status: COMPLETED | COMMUNITY
Start: 2022-02-08

## 2022-04-04 RX ORDER — AZITHROMYCIN 250 MG/1
250 TABLET, FILM COATED ORAL
Qty: 6 | Refills: 0 | Status: COMPLETED | COMMUNITY
Start: 2021-12-23

## 2022-04-04 NOTE — HISTORY OF PRESENT ILLNESS
[FreeTextEntry1] : Patient is here for Post-op visit  D&C hysteroscopy done 3-  [TextBox_4] : 61-year-old in menopause came for postop exam after hysteroscopy D&C was done for thickened endometrium.  Patient states she has no further vaginal spotting or pelvic pain.  She does state increased urinary frequency but no dysuria but states she feels like she is increased her water intake.  U dip is negative.  We discussed findings of procedure and pathology showing benign polyps with benign endometrial and endocervical curettings.  Pain and bleeding precautions were counseled.  Patient understood all discussion and all questions answered satisfactorily.\par

## 2022-04-04 NOTE — COUNSELING
[Nutrition/ Exercise/ Weight Management] : nutrition, exercise, weight management [Vitamins/Supplements] : vitamins/supplements [Breast Self Exam] : breast self exam [Bladder Hygiene] : bladder hygiene [Lab Results] : lab results [Medication Management] : medication management [Pre/Post Op Instructions] : pre/post op instructions

## 2022-04-04 NOTE — PHYSICAL EXAM
[Appropriately responsive] : appropriately responsive [Alert] : alert [No Acute Distress] : no acute distress [Regular Rate Rhythm] : regular rate rhythm [Soft] : soft [Non-tender] : non-tender [Non-distended] : non-distended [No Mass] : no mass [Oriented x3] : oriented x3 [Vulvar Atrophy] : vulvar atrophy [No Lesions] : no lesions  [Labia Majora] : normal [Labia Minora] : normal [Atrophy] : atrophy [No Bleeding] : There was no active vaginal bleeding [Normal] : normal [Tenderness] : nontender [Enlarged ___ wks] : not enlarged [Mass ___ cm] : no uterine mass was palpated [Uterine Adnexae] : normal

## 2022-04-04 NOTE — DISCUSSION/SUMMARY
[FreeTextEntry1] : A: 61-year-old for postop exam, status post hysteroscopic polypectomy D&C, menopause\par \par P: Postop exam done-healing well\par Safe sex practices if active\par Pain and bleeding precautions\par Results reviewed and discussed\par Follow-up for routine exam in November or as needed

## 2022-10-20 ENCOUNTER — APPOINTMENT (OUTPATIENT)
Dept: CARDIOLOGY | Facility: CLINIC | Age: 62
End: 2022-10-20

## 2022-10-20 ENCOUNTER — RESULT CHARGE (OUTPATIENT)
Age: 62
End: 2022-10-20

## 2022-10-20 VITALS
HEART RATE: 41 BPM | OXYGEN SATURATION: 98 % | HEIGHT: 62 IN | BODY MASS INDEX: 25.21 KG/M2 | SYSTOLIC BLOOD PRESSURE: 126 MMHG | WEIGHT: 137 LBS | DIASTOLIC BLOOD PRESSURE: 68 MMHG | TEMPERATURE: 97.5 F

## 2022-10-20 VITALS — RESPIRATION RATE: 16 BRPM

## 2022-10-20 DIAGNOSIS — Z86.79 PERSONAL HISTORY OF OTHER DISEASES OF THE CIRCULATORY SYSTEM: ICD-10-CM

## 2022-10-20 DIAGNOSIS — Z86.39 PERSONAL HISTORY OF OTHER ENDOCRINE, NUTRITIONAL AND METABOLIC DISEASE: ICD-10-CM

## 2022-10-20 PROCEDURE — 99204 OFFICE O/P NEW MOD 45 MIN: CPT

## 2022-10-20 RX ORDER — DILTIAZEM HYDROCHLORIDE 120 MG/1
120 TABLET ORAL TWICE DAILY
Refills: 0 | Status: ACTIVE | COMMUNITY
Start: 2021-09-14

## 2022-10-20 RX ORDER — GABAPENTIN 100 MG/1
100 CAPSULE ORAL
Qty: 30 | Refills: 0 | Status: DISCONTINUED | COMMUNITY
Start: 2022-01-20 | End: 2022-10-20

## 2022-10-20 NOTE — HISTORY OF PRESENT ILLNESS
[FreeTextEntry1] : 62 year old woman with hyperthyroidism and HTN who presents to [facility] care. \par \par She has been having symptoms of palpitations for about a month. They happen at least once a day. Her heart starts racing and pounding, she has associated chest pressure and shortness of breath. She has had multiple pre-syncopal episodes but has not had syncope. She has leg swelling because of neuromas in her right foot. She denies orthopnea and PND. She called her endocrinologist when this happened and she was told to double her metoprolol and take an extra dose of methimazole and get blood work. Her blood work showed that her thyroid levels were normal. Of note, d-dimer and BNP were normal as well. She was told to call her cardiologist but their next available appointment was in december. Thus, she has transferred her care. \par \par Chart review shows "history of atrial fibrillation". She said her endocrinologist told her she had afib in [id number] when she was first diagnosed with hyperthyroidism. She then went to cardiology, wore a 24 hour heart monitor and had a stress test and was told she did not have atrial fibrillation. She remained on the beta blocker however.

## 2022-10-20 NOTE — PHYSICAL EXAM

## 2022-10-20 NOTE — ASSESSMENT
[FreeTextEntry1] : 62 year old woman with hyperthyroidism and HTN who presents to [facility] care. \par \par 1. Palpitations: Symptoms have been going on for about a month and occur at least once a day. EKG in the office today shows bradycardia to the 40s with ectopic atrial rhythm. Her beta blocker was doubled, but it has not helped her symptoms. On recent labs from 9/22/22, which were personally reviewed by me, her potassium is 3.6. She may be going into paroxysmal atrial fibrillation. \par - Check 7 day MCOT\par - Reduce metoprolol succinate back down to 25mg daily as it has not helped her symptoms and she is feeling pre-syncope\par - Advised her to eat a high potassium diet \par \par 2. Shortness of breath: Sometimes associated with the palpitations but also associated with exertion. \par - check echocardiogram\par \par 3. Hypertension: BP at goal today. Her goal is <130/80 mmHg. \par - Continue diltiazem\par \par 4. Hyperthyroidism: BLood work from 9/22/22 reviewed and T3, T4 normal. Continue management as per endocrinologist. \par \par RTC in 4 weeks.

## 2022-10-20 NOTE — REVIEW OF SYSTEMS
[Feeling Fatigued] : feeling fatigued [SOB] : shortness of breath [Dyspnea on exertion] : dyspnea during exertion [Chest Discomfort] : chest discomfort [Palpitations] : palpitations [Dizziness] : dizziness [Negative] : Heme/Lymph

## 2022-10-24 ENCOUNTER — APPOINTMENT (OUTPATIENT)
Dept: CARDIOLOGY | Facility: CLINIC | Age: 62
End: 2022-10-24

## 2022-10-24 PROCEDURE — 93306 TTE W/DOPPLER COMPLETE: CPT

## 2022-11-08 ENCOUNTER — APPOINTMENT (OUTPATIENT)
Dept: CARDIOLOGY | Facility: CLINIC | Age: 62
End: 2022-11-08

## 2022-11-08 PROCEDURE — 93272 ECG/REVIEW INTERPRET ONLY: CPT

## 2022-12-05 ENCOUNTER — APPOINTMENT (OUTPATIENT)
Dept: OBGYN | Facility: CLINIC | Age: 62
End: 2022-12-05
Payer: MEDICARE

## 2022-12-05 VITALS
SYSTOLIC BLOOD PRESSURE: 127 MMHG | HEIGHT: 62 IN | TEMPERATURE: 96.7 F | WEIGHT: 134 LBS | DIASTOLIC BLOOD PRESSURE: 80 MMHG | BODY MASS INDEX: 24.66 KG/M2 | HEART RATE: 69 BPM

## 2022-12-05 DIAGNOSIS — R93.89 ABNORMAL FINDINGS ON DIAGNOSTIC IMAGING OF OTHER SPECIFIED BODY STRUCTURES: ICD-10-CM

## 2022-12-05 DIAGNOSIS — Z78.0 ASYMPTOMATIC MENOPAUSAL STATE: ICD-10-CM

## 2022-12-05 DIAGNOSIS — Z09 ENCOUNTER FOR FOLLOW-UP EXAMINATION AFTER COMPLETED TREATMENT FOR CONDITIONS OTHER THAN MALIGNANT NEOPLASM: ICD-10-CM

## 2022-12-05 DIAGNOSIS — Z98.890 OTHER SPECIFIED POSTPROCEDURAL STATES: ICD-10-CM

## 2022-12-05 DIAGNOSIS — R14.0 ABDOMINAL DISTENSION (GASEOUS): ICD-10-CM

## 2022-12-05 PROCEDURE — 99396 PREV VISIT EST AGE 40-64: CPT

## 2022-12-08 LAB
C TRACH RRNA SPEC QL NAA+PROBE: NOT DETECTED
CYTOLOGY CVX/VAG DOC THIN PREP: ABNORMAL
HPV HIGH+LOW RISK DNA PNL CVX: NOT DETECTED
N GONORRHOEA RRNA SPEC QL NAA+PROBE: NOT DETECTED
SOURCE AMPLIFICATION: NORMAL

## 2022-12-09 LAB
SOURCE AMPLIFICATION: NORMAL
T VAGINALIS RRNA SPEC QL NAA+PROBE: NOT DETECTED

## 2022-12-12 ENCOUNTER — APPOINTMENT (OUTPATIENT)
Dept: CARDIOLOGY | Facility: CLINIC | Age: 62
End: 2022-12-12

## 2022-12-12 VITALS
HEIGHT: 62 IN | DIASTOLIC BLOOD PRESSURE: 90 MMHG | SYSTOLIC BLOOD PRESSURE: 128 MMHG | BODY MASS INDEX: 25.4 KG/M2 | RESPIRATION RATE: 17 BRPM | WEIGHT: 138 LBS | TEMPERATURE: 98.1 F

## 2022-12-12 PROCEDURE — 99213 OFFICE O/P EST LOW 20 MIN: CPT

## 2022-12-12 RX ORDER — OMEPRAZOLE 10 MG/1
10 CAPSULE, DELAYED RELEASE ORAL DAILY
Refills: 0 | Status: ACTIVE | COMMUNITY

## 2022-12-12 RX ORDER — METOPROLOL SUCCINATE 25 MG/1
25 TABLET, EXTENDED RELEASE ORAL DAILY
Qty: 90 | Refills: 1 | Status: ACTIVE | COMMUNITY
Start: 2022-12-12 | End: 1900-01-01

## 2022-12-12 RX ORDER — ALBUTEROL SULFATE 90 UG/1
108 (90 BASE) INHALANT RESPIRATORY (INHALATION)
Qty: 18 | Refills: 0 | Status: ACTIVE | COMMUNITY
Start: 2022-12-08

## 2022-12-12 RX ORDER — METOPROLOL TARTRATE 50 MG/1
50 TABLET, FILM COATED ORAL
Refills: 0 | Status: DISCONTINUED | COMMUNITY

## 2022-12-12 NOTE — HISTORY OF PRESENT ILLNESS
[FreeTextEntry1] : 62 year old woman with hyperthyroidism and HTN who presents to [facility] care. \par \par Chart review shows "history of atrial fibrillation". She said her endocrinologist told her she had afib in [id number] when she was first diagnosed with hyperthyroidism. She then went to cardiology, wore a 24 hour heart monitor and had a stress test and was told she did not have atrial fibrillation. She remained on the beta blocker however. \par \par She had COVID over Thanksgiving and took Paxlovid. She is still feeling tired and fatigued and short of breath. Her palpitations have resolved though. She denies chest pain. No leg swelling, orthopnea and PND.

## 2022-12-12 NOTE — ASSESSMENT
[FreeTextEntry1] : 62 year old woman with hyperthyroidism and HTN who presents for follow up today.\par \par 1. Palpitations: Resolved with reducing methimazole dose and decreasing metoprolol dose. MCOT reviewed and no significant arrhythmias. Continue metoprolol succinate 25mg daily. \par \par 2. Shortness of breath: Sometimes associated with the palpitations but also associated with exertion. Her echocardiogram was normal. If her symptoms worsen, we will do a stress test. \par \par 3. Hypertension: BP at goal today. Her goal is <130/80 mmHg. \par - Continue diltiazem\par \par 4. Hyperthyroidism: Management as per her endocrinologist.\par \par RTC in six months.

## 2022-12-12 NOTE — CARDIOLOGY SUMMARY
[de-identified] : 10/20/22: ectopic atrial rhythm, bradycardia with HR in the 40s  [de-identified] : 7 day MCOT: 11/8/22: no arrhythmias [de-identified] : 10/24/22: Normal left and right ventricular size and systolic function. No significant valve disease.\par

## 2023-01-01 NOTE — ASU PREOP CHECKLIST - BMI (KG/M2)
History & Physical    Girl Wiliam Bustamante is a 17-hour old female infant, delivered via Vaginal, Spontaneous [250] at Gestational Age: 40w6d. Infant weighed 8 lb 3.9 oz (3740 g) with a Classification: AGA (85.51 %) (05/15/23 1942).    Infant Charlotte  seen with mom and dad Carlos Estrella.  This is first child for both parents.      Dad notes he has history of Ehlos Danos,not heart kind and also HDL B27 gene.  His dad and aunt have anklyosis spondylitis.        Family history of jaundice requiring phototherapy: dad, double therapy.  PGP arrived and confirmed history given by dad   Family history of DDH:  no    No FH CHD    Breast feeding is going well.       MATERNAL INFORMATION:     From mom's admit note:     Wiliam Bustamante is a 24 year old  female at 40w6d  gestation who presents with postdates induction.  Her current pregnancy is significant for postdates.  Patient reports no complaints.          Age, /Para, SINDY:   Information for the patient's mother:  Wiliam Bustamante [[id number]]   25 year old          2023, by Ultrasound       steroids during pregnancy: None     Information for the patient's mother:  Wiliam Bustamante [[id number]]     Social History     Tobacco Use   • Smoking status: Never   • Smokeless tobacco: Never   Vaping Use   • Vaping status: Not on file   Substance Use Topics   • Alcohol use: Not Currently      Information for the patient's mother:  Wiliam Bustamante [[id number]]     Drug Use:    Not Current*    Information for the patient's mother:  Wiliam Bustamante [[id number]]     Past Medical History:   Diagnosis Date   • Anxiety disorder     on zoloft   • Miscarriage 2022      Information for the patient's mother:  Wiliam Bustamante [[id number]]     Patient Active Problem List   Diagnosis   • Susceptible to varicella (non-immune), currently pregnant   • Upper abdominal pain   • Abdominal cramping affecting pregnancy        Prenatal Ultrasound:   22  IMPRESSION:   Single  live intrauterine gestation, estimated gestational age of 16 weeks 3  days for an SINDY of 2023.      Normal fetal anatomic survey.    22  \IMPRESSION:   Single live intrauterine gestation, estimated gestational age of 20 weeks 1  days for an SINDY of 2023.      Normal fetal anatomic survey.     Prenatal Labs:  Information for the patient's mother:  Wiliam Bustamante [[id number]]     HIV Antigen/ Antibody Combo Screen (no units)   Date Value   10/19/2022 Nonreactive     Hepatitis B Surface Antigen (no units)   Date Value   10/19/2022 Negative     Hepatitis C Antibody (no units)   Date Value   10/19/2022 Negative     RPR (no units)   Date Value   2023 Nonreactive   10/19/2022 Nonreactive     Rubella Antibody, IgG (Units/mL)   Date Value   10/19/2022 3.4 (L)     ABO/RH(D) (no units)   Date Value   2023 O Rh Positive     ANTIBODY SCREEN (no units)   Date Value   2023 Negative        GBS:   Information for the patient's mother:  Wiliam Bustamante [[id number]]     CULTURE, STREPTOCOCCUS GROUP B WITH SENSITIVITIES (PENICILLIN ALLERGIC) (no units)   Date Value   2023     Negative for  Streptococcus agalactiae (Strep group B)        Antibiotics: No antibiotics needed.      Maternal Immunizations:   Tdap vaccination:  Received this pregnancy  Covid vaccination/doses: 3 vaccinations received , up to date and Last vaccination date 10/28/22  Influenza vaccination: Received this pregnancy    BIRTH HISTORY:     Delivery Method: Vaginal, Spontaneous [250]   Rupture date & time: 2023 6:29 PM   Date & time of birth 2023 7:42 PM   Induction: Misoprostol Post-term Gestation   Labor complications: None     Placenta appearance: Intact   Cord info/complications: 3 Vessels None       Delayed cord clamping: Yes   Indications for :     Presentation/position: Vertex Right Occiput Anterior   Forceps attempted? No     Vacuum attempted? No     Shoulder dystocia? No                           INFORMATION   Resuscitation:  None        APGARS  One minute Five minutes   Skin color: 0  1    Heart rate: 2  2     Reflex: 2  2    Muscle tone: 2  2    Breathin  2    Totals:   8  9      Cord Blood/ Evaluation (CRD)  Recent Labs   Lab 05/15/23  1949   ABORHDABR O Rh Positive   DIGG Negative     Blood gases sent? No   Cord pH No results found     Medications  Current Facility-Administered Medications   Medication Dose Route Frequency Provider Last Rate Last Admin   • dextrose (GLUTOSE) 0.4 g/mL (40%) gel 2 mL  0.5 mL/kg (Dosing Weight) Buccal Fernando Delong MD           PHYSICAL EXAM     VITALS:   Visit Vitals  Pulse 136   Temp 98.1 °F (36.7 °C) (Axillary)   Resp 38   Ht 50.8 cm Comment: Filed from Delivery Summary   Wt 3660 g   HC 36 cm (14.17\") Comment: Filed from Delivery Summary   BMI 14.18 kg/m²     Intake/Output       05/15 07 0659  07 0659          Urine Occurrence 2 x 1 x    Stool Occurrence 4 x 1 x    Unmeasured Breast Milk Occurrence 7 x 1 x          Birth Measurements:        Weight: 8 lb 3.9 oz (3740 g)        Length: 20\"        Head circumference: 36 cm        Classification: AGA (85.51 %) (05/15/23 1942)          86 %ile (Z= 0.83) based on WHO (Girls, 0-2 years) weight-for-age data using vitals from 2023.        81 %ile (Z= 0.89) based on WHO (Girls, 0-2 years) Length-for-age data based on Length recorded on 2023.        96 %ile (Z= 1.79) based on WHO (Girls, 0-2 years) head circumference-for-age based on Head Circumference recorded on 2023.       GENERAL:Baby Girl is an alert, vigorous female with appropriate behavior. She is in no acute distress.  Big infant.   SKIN: Her skin is warm with normal turgor. The skin is jennie and erythema toxicum . There are no bruises or other signs of injury.   Stork bites posterior scalp, nape neck, eyelids.   HEAD: The head is atraumatic and normocephalic. The anterior fontanel is open and flat.  EYES: The  23.4 conjunctivae appear normal with neither icterus nor subconjunctival hemorrhage.   Red reflexes are seen bilaterally.  EARS: Pinnae normal.  NOSE: There is no nasal flaring, nares patent bilaterally.  THROAT:  The oropharynx is normal.  There is no cleft of the palate.  Lower gum with raised gum/ paul tooth.  I did not feel or see two   NECK: Clavicles without crepitus.  TRUNK AND THORAX: There are no lesions on the trunk; there is no dimple over the presacral area. There are no retractions.  LUNGS: The lung fields are clear to auscultation.  HEART: The precordium is quiet. The heart rhythm is grossly regular. S1 and S2 are normal. There are no murmurs. Normal femoral pulses.  ABDOMEN: The umbilical cord stump is normal. There is not an umbilical hernia. No hepato-splenomegaly/masses. The abdomen is flat and soft.   GENITALIA: normal female genitalia  RECTAL: anus patent; large stool   EXTREMITIES: Moving moving bilateral upper and lower extremities, symmetrically.   The hip exam is normal. There are no hip clicks or clunks.    NEUROLOGIC: She displays normal tone throughout. She is not jittery.    ASSESSMENT     Well 17-hour old female infant.    Patient Active Problem List   Diagnosis   • Single liveborn, born in hospital, delivered by vaginal delivery   • Infant with gestation period over 40 weeks to 42 completed weeks   • Rubella non-immune status, antepartum   • Family history of ankylosing spondylitis in pat grandpa and pat great aunt   • Family history of genetic disease carrier: dad HLA B27 gene carrier; not Ankylosing sponydlitis    • Family history of anxiety, mom on sertraline    • ETN (erythema toxicum neonatorum)   • Stork bites   • Paul tooth     mom Varicella non immune.     Mom had MMR x 2 and varicella x 2 in past.     No antepartum gc/chlamydia testing done.     S/p birth hepatitis B vaccine received.       Hearing exam: [place] Hearing Test Machine: Auditory Brainstem Response (Algo) (23  219)  [place] Hearing Test Results: Pass R, Pass L (23)     Infant received eye antibiotics prophylaxis and vit K.     Family electing to stay overnight to continue to work on breast feeding.  Am recheck TcB   PLAN      Sepsis:    Risk Scores: Risk - Well Appearin.03; Risk - Equivocal: 0.34   Sepsis Classification: (most recent) Well Appearing (23 05)  Plan: Based on risk scores and a current classification of Well Appearing, routine vital signs. no CBC or blood cultures. No antibiotics.       Routine  care.    [place] is currently being fed Breast milk only.   I am aware that mother's feeding choice upon admission was the following:   Information for the patient's mother:  Wiliam Bustamante [[id number]]   Exclusive breast milk feeding   There are no medical contraindications to exclusive breast milk feeding, and the benefits of exclusively breastfeeding were discussed/reinforced with the mother.       Mom willing to get VZV and MMR shots.     Signed by: Yeni Mortensen MD   2023

## 2023-01-20 PROBLEM — Z78.0 MENOPAUSE: Status: ACTIVE | Noted: 2021-11-29

## 2023-01-20 PROBLEM — R93.89 ENDOMETRIAL THICKENING ON ULTRASOUND: Status: RESOLVED | Noted: 2022-01-24 | Resolved: 2023-01-20

## 2023-01-20 PROBLEM — Z09 POSTOPERATIVE EXAMINATION: Status: RESOLVED | Noted: 2022-04-04 | Resolved: 2023-01-20

## 2023-01-20 PROBLEM — Z98.890 STATUS POST HYSTEROSCOPIC POLYPECTOMY: Status: RESOLVED | Noted: 2022-04-04 | Resolved: 2023-01-20

## 2023-01-20 PROBLEM — R14.0 ABDOMINAL BLOATING: Status: RESOLVED | Noted: 2021-11-30 | Resolved: 2023-01-20

## 2023-01-20 NOTE — HISTORY OF PRESENT ILLNESS
[Patient reported mammogram was normal] : Patient reported mammogram was normal [Patient reported PAP Smear was normal] : Patient reported PAP Smear was normal [Patient reported bone density results were abnormal] : Patient reported bone density results were abnormal [Patient reported colonoscopy was normal] : Patient reported colonoscopy was normal [LMP unknown] : LMP unknown [postmenopausal] : postmenopausal [N] : Patient is not sexually active [Y] : Positive pregnancy history [unknown] : Patient is unsure of the date of her LMP [Menarche Age: ____] : age at menarche was [unfilled] [Currently In Menopause] : currently in menopause [Menopause Age: ____] : age at menopause was [unfilled] [No] : Patient does not have concerns regarding sex [Previously active] : previously active [Gonorrhea test offered] : Gonorrhea test offered [Chlamydia test offered] : Chlamydia test offered [Trichomonas test offered] : Trichomonas test offered [HPV test offered] : HPV test offered [TextBox_4] : 62-year-old para 2-0-2-2 in menopause came for annual GYN exam and Pap smear.  She denies postmenopausal bleeding, pelvic pain, discharge, dysuria or other GYN complaints.  Since her D&C hysteroscopy and polypectomy in the last year she has had no symptoms or complaints.  She has history of abnormal Pap with colposcopy but normal after.  She denies other STDs, fibroids or ovarian cysts.  She is not currently sexually active. [Mammogramdate] : 12/2021 [TextBox_19] : Will give referral [PapSmeardate] : 11/2021 [BoneDensityDate] : 6/2021 [TextBox_37] : osteoporosis forearm, osteopenia spine/femur/hip [ColonoscopyDate] : 2018 [TextBox_43] : f/u 5 yrs  [PGHxTotal] : 4 [[facility] : 2 [PGHxPremature] : 0 [PGHxAbortions] : 2 [[facility]Living] : 2 [PGHxABSpont] : 2 [FreeTextEntry1] : 2x

## 2023-01-20 NOTE — DISCUSSION/SUMMARY
[FreeTextEntry1] : A: 62-year-old for annual GYN exam, vulvovaginal atrophy, menopause\par \par P: GYN exam done\par Pap smear done\par Safe sex practices if active\par Pain and bleeding precautions\par Referral for mammogram given\par Encourage osteoporosis management\par Encourage healthy diet and exercise\par Follow-up for routine exam and as needed

## 2023-01-20 NOTE — PHYSICAL EXAM
[Appropriately responsive] : appropriately responsive [Alert] : alert [No Acute Distress] : no acute distress [No Lymphadenopathy] : no lymphadenopathy [Regular Rate Rhythm] : regular rate rhythm [Soft] : soft [Non-tender] : non-tender [Non-distended] : non-distended [No Mass] : no mass [Oriented x3] : oriented x3 [Examination Of The Breasts] : a normal appearance [No Discharge] : no discharge [No Masses] : no breast masses were palpable [Vulvar Atrophy] : vulvar atrophy [No Lesions] : no lesions  [Labia Majora] : normal [Labia Minora] : normal [Normal] : normal [Atrophy] : atrophy [No Bleeding] : There was no active vaginal bleeding [Tenderness] : nontender [Enlarged ___ wks] : not enlarged [Mass ___ cm] : no uterine mass was palpated [Uterine Adnexae] : normal [FreeTextEntry6] : Pap smear done

## 2023-06-12 ENCOUNTER — APPOINTMENT (OUTPATIENT)
Dept: CARDIOLOGY | Facility: CLINIC | Age: 63
End: 2023-06-12
Payer: MEDICARE

## 2023-06-12 VITALS
SYSTOLIC BLOOD PRESSURE: 149 MMHG | OXYGEN SATURATION: 98 % | HEART RATE: 49 BPM | TEMPERATURE: 97.9 F | BODY MASS INDEX: 25.4 KG/M2 | HEIGHT: 62 IN | WEIGHT: 138 LBS | RESPIRATION RATE: 15 BRPM | DIASTOLIC BLOOD PRESSURE: 82 MMHG

## 2023-06-12 DIAGNOSIS — R06.02 SHORTNESS OF BREATH: ICD-10-CM

## 2023-06-12 PROCEDURE — 93000 ELECTROCARDIOGRAM COMPLETE: CPT

## 2023-06-12 PROCEDURE — 99214 OFFICE O/P EST MOD 30 MIN: CPT

## 2023-06-12 NOTE — CARDIOLOGY SUMMARY
[de-identified] : 6/12/23: sinus bradycardia [de-identified] : 7 day MCOT: 11/8/22: no arrhythmias  [de-identified] : 10/24/22: Normal left and right ventricular size and systolic function. No significant valve disease.

## 2023-06-12 NOTE — HISTORY OF PRESENT ILLNESS
[FreeTextEntry1] : ABILIO SAENZ is a 63 year old woman with hyperthyroidism and HTN who presents for follow up today.\par \par The patient denies chest pain, shortness of breath, palpitations and syncope. No leg swelling, orthopnea and PND.\par \par Chart review shows "history of atrial fibrillation". She said her endocrinologist told her she had afib in [id number] when she was first diagnosed with hyperthyroidism. She then went to cardiology, wore a 24 hour heart monitor and had a stress test and was told she did not have atrial fibrillation. She remained on the beta blocker however. \par

## 2023-06-12 NOTE — ASSESSMENT
[FreeTextEntry1] : 63 year old woman with hyperthyroidism and HTN who presents to [facility] care. \par \par 1. Palpitations: Resolved with reducing methimazole dose and decreasing metoprolol dose. MCOT reviewed and no significant arrhythmias. Continue metoprolol succinate 25mg daily. \par \par 2. Shortness of breath: Symptoms resolved. Echocardiogram without structural abnormalities. \par \par 3. Hypertension: BP above goal today. Her goal is <130/80 mmHg. \par - Recommended to keep a BP log at home and call me if BP is consistently above goal\par - Continue diltiazem\par \par 4. Hyperthyroidism: Management as per her endocrinologist.\par \par The primary prevention of heart disease was discussed in detail with the patient, including adhering to a heart healthy, plant based, or Mediterranean diet, and the importance of 30 minutes of moderate intensity activity for 30 minutes, 5 times a week. All the patient's questions were answered.\par \par \par RTC in six months.

## 2023-12-11 ENCOUNTER — APPOINTMENT (OUTPATIENT)
Dept: OBGYN | Facility: CLINIC | Age: 63
End: 2023-12-11
Payer: MEDICARE

## 2023-12-11 VITALS
BODY MASS INDEX: 24.29 KG/M2 | SYSTOLIC BLOOD PRESSURE: 130 MMHG | WEIGHT: 132 LBS | DIASTOLIC BLOOD PRESSURE: 82 MMHG | TEMPERATURE: 98.7 F | HEIGHT: 62 IN | HEART RATE: 51 BPM

## 2023-12-11 DIAGNOSIS — Z87.39 PERSONAL HISTORY OF OTHER DISEASES OF THE MUSCULOSKELETAL SYSTEM AND CONNECTIVE TISSUE: ICD-10-CM

## 2023-12-11 DIAGNOSIS — M81.0 AGE-RELATED OSTEOPOROSIS W/OUT CURRENT PATHOLOGICAL FRACTURE: ICD-10-CM

## 2023-12-11 DIAGNOSIS — Z71.89 OTHER SPECIFIED COUNSELING: ICD-10-CM

## 2023-12-11 DIAGNOSIS — E07.9 OTHER SPECIFIED DISORDERS OF EYE AND ADNEXA: ICD-10-CM

## 2023-12-11 DIAGNOSIS — Z01.419 ENCOUNTER FOR GYNECOLOGICAL EXAMINATION (GENERAL) (ROUTINE) W/OUT ABNORMAL FINDINGS: ICD-10-CM

## 2023-12-11 DIAGNOSIS — Z12.4 ENCOUNTER FOR SCREENING FOR MALIGNANT NEOPLASM OF CERVIX: ICD-10-CM

## 2023-12-11 DIAGNOSIS — H57.89 OTHER SPECIFIED DISORDERS OF EYE AND ADNEXA: ICD-10-CM

## 2023-12-11 DIAGNOSIS — N95.2 POSTMENOPAUSAL ATROPHIC VAGINITIS: ICD-10-CM

## 2023-12-11 PROCEDURE — 99396 PREV VISIT EST AGE 40-64: CPT

## 2023-12-11 PROCEDURE — G0101: CPT

## 2023-12-13 LAB — HPV HIGH+LOW RISK DNA PNL CVX: NOT DETECTED

## 2023-12-15 LAB — CYTOLOGY CVX/VAG DOC THIN PREP: ABNORMAL

## 2023-12-18 ENCOUNTER — APPOINTMENT (OUTPATIENT)
Dept: CARDIOLOGY | Facility: CLINIC | Age: 63
End: 2023-12-18
Payer: MEDICARE

## 2023-12-18 VITALS
RESPIRATION RATE: 14 BRPM | DIASTOLIC BLOOD PRESSURE: 78 MMHG | HEIGHT: 62 IN | SYSTOLIC BLOOD PRESSURE: 140 MMHG | OXYGEN SATURATION: 97 % | WEIGHT: 132 LBS | TEMPERATURE: 96.9 F | BODY MASS INDEX: 24.29 KG/M2 | HEART RATE: 53 BPM

## 2023-12-18 DIAGNOSIS — R00.2 PALPITATIONS: ICD-10-CM

## 2023-12-18 DIAGNOSIS — I10 ESSENTIAL (PRIMARY) HYPERTENSION: ICD-10-CM

## 2023-12-18 DIAGNOSIS — R03.0 ELEVATED BLOOD-PRESSURE READING, W/OUT DIAGNOSIS OF HYPERTENSION: ICD-10-CM

## 2023-12-18 PROCEDURE — 93000 ELECTROCARDIOGRAM COMPLETE: CPT

## 2023-12-18 PROCEDURE — 99213 OFFICE O/P EST LOW 20 MIN: CPT

## 2023-12-18 RX ORDER — MONTELUKAST 10 MG/1
10 TABLET, FILM COATED ORAL
Refills: 0 | Status: ACTIVE | COMMUNITY

## 2023-12-18 RX ORDER — TRAMADOL HYDROCHLORIDE 50 MG/1
50 TABLET, COATED ORAL
Qty: 14 | Refills: 0 | Status: DISCONTINUED | COMMUNITY
Start: 2022-12-08 | End: 2023-12-18

## 2023-12-18 RX ORDER — MELOXICAM 15 MG/1
15 TABLET ORAL
Refills: 0 | Status: ACTIVE | COMMUNITY

## 2023-12-18 RX ORDER — PHENAZOPYRIDINE HYDROCHLORIDE 200 MG/1
200 TABLET ORAL
Qty: 6 | Refills: 0 | Status: DISCONTINUED | COMMUNITY
Start: 2022-10-12 | End: 2023-12-18

## 2023-12-18 RX ORDER — METHIMAZOLE 5 MG/1
5 TABLET ORAL
Refills: 0 | Status: ACTIVE | COMMUNITY

## 2023-12-18 NOTE — ASSESSMENT
[FreeTextEntry1] : 63 year old woman with hyperthyroidism and HTN who presents for follow up today.  1. Palpitations: Resolved with reducing methimazole dose and decreasing metoprolol dose. MCOT reviewed and no significant arrhythmias. Continue metoprolol succinate 25mg daily.  2. Hypertension: BP at goal today. Her goal is <130/80 mmHg. - Recommended to keep a BP log at home and call me if BP is consistently above goal - Continue diltiazem  The primary prevention of heart disease was discussed in detail with the patient, including adhering to a heart healthy, plant based, or Mediterranean diet, and the importance of 30 minutes of moderate intensity activity for 30 minutes, 5 times a week. All the patient's questions were answered.  RTC in six months.

## 2023-12-18 NOTE — HISTORY OF PRESENT ILLNESS
[FreeTextEntry1] : ABILIO SAENZ is a 63 year old woman with hyperthyroidism and HTN who presents for follow up today.  The patient denies chest pain, shortness of breath, palpitations and syncope. No leg swelling, orthopnea and PND.  For her HTN, she is on diltiazem. She does not check her BP at home.

## 2023-12-18 NOTE — CARDIOLOGY SUMMARY
[de-identified] : 12/18/23: sinus bradycardia 6/12/23: sinus bradycardia [de-identified] : 7 day MCOT: 11/8/22: no arrhythmias  [de-identified] : 10/24/22: Normal left and right ventricular size and systolic function. No significant valve disease.

## 2024-01-20 ENCOUNTER — NON-APPOINTMENT (OUTPATIENT)
Age: 64
End: 2024-01-20

## 2024-01-29 ENCOUNTER — APPOINTMENT (OUTPATIENT)
Dept: PLASTIC SURGERY | Facility: CLINIC | Age: 64
End: 2024-01-29
Payer: MEDICARE

## 2024-01-29 VITALS — WEIGHT: 130 LBS | BODY MASS INDEX: 23.92 KG/M2 | HEIGHT: 62 IN

## 2024-01-29 PROCEDURE — 99203 OFFICE O/P NEW LOW 30 MIN: CPT

## 2024-01-29 NOTE — REASON FOR VISIT
[FreeTextEntry1] : [facility]; PMD-[facility]; Cardiology-[facility] [Initial Evaluation] : an initial evaluation

## 2024-01-29 NOTE — ASSESSMENT
[FreeTextEntry1] : 64 yo with PMHx GERD, arthritis, fibromyalgia, RSD/CRPS of spine, headache, HTN, OA, hyperthyroidism with long-standing back cyst and left medial thigh mass c/w lipoma on clincal exam  Recommend left paraspinal back cyst under local anesthesia.  -Benefits, risks and alternatives of the procedure were discussed. The risks include but not limited to bleeding, infection, poor wound healing and scarring, possible keloid, cyst recurrence, and need for re-operation. Location of scar and expected post-surgical outcomes were discussed. The patient understands the risks and would like to proceed with the office surgery. -Recommend left medial thigh lipoma excision in a separate stage in [facility], if patient so desires. -All questions were answered -Will schedule at earliest convenience

## 2024-01-29 NOTE — HISTORY OF PRESENT ILLNESS
[FreeTextEntry1] : 64 yo with PMHx GERD, arthritis, fibromyalgia, RSD/CRPS of spine, headache, HTN, OA, hyperthyroidism referred by [facility] with 12-15 years back cyst, h/o intermittent drainage.  Denies fevers, chills Also complaint of left medial thigh bump x several decades  No family hx of skin cancer. No personal hx of skin cancer. No ASA use  Here to discuss treatment options.

## 2024-01-29 NOTE — PHYSICAL EXAM
[NI] : Normal [de-identified] : left medial central thigh 7 cm soft mobile mass, nontender [de-identified] : left mid-paraspinal subcutaneous cyst, 1.8 cm, no active drainage or erythema

## 2024-02-02 NOTE — H&P PST ADULT - NSICDXPASTMEDICALHX_GEN_ALL_CORE_FT
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    
solids

## 2024-02-15 ENCOUNTER — NON-APPOINTMENT (OUTPATIENT)
Age: 64
End: 2024-02-15

## 2024-03-18 ENCOUNTER — APPOINTMENT (OUTPATIENT)
Dept: PLASTIC SURGERY | Facility: CLINIC | Age: 64
End: 2024-03-18
Payer: MEDICARE

## 2024-03-18 DIAGNOSIS — D48.5 NEOPLASM OF UNCERTAIN BEHAVIOR OF SKIN: ICD-10-CM

## 2024-03-18 PROCEDURE — 12031 INTMD RPR S/A/T/EXT 2.5 CM/<: CPT

## 2024-03-18 PROCEDURE — 11403 EXC TR-EXT B9+MARG 2.1-3CM: CPT

## 2024-03-18 NOTE — REASON FOR VISIT
[Initial Evaluation] : an initial evaluation [FreeTextEntry1] : [facility]; PMD-[facility]; Cardiology-[facility]

## 2024-03-18 NOTE — HISTORY OF PRESENT ILLNESS
[FreeTextEntry1] : 62 yo with PMHx GERD, arthritis, fibromyalgia, RSD/CRPS of spine, headache, HTN, OA, hyperthyroidism referred by [facility] with 12-15 years back cyst, h/o intermittent drainage.  Denies fevers, chills Also complaint of left medial thigh bump x several decades  No family hx of skin cancer. No personal hx of skin cancer. No ASA use  Here to discuss treatment options.  Interval hx (3/18/24):  Here for back cyst excision.

## 2024-03-18 NOTE — PROCEDURE
[FreeTextEntry2] : left paraspinal back cyst excision  [FreeTextEntry1] : left paraspinal back cyst [FreeTextEntry7] : left paraspinal back cyst [FreeTextEntry6] : Benefits, risks and alternatives of the procedure were discussed. The risks include but not limited to bleeding, infection, poor wound healing and scarring, possible keloid, and need for re-operation. Location of scar and expected post-surgical outcomes were discussed. The patient understands the risks and would like to proceed with the office surgery.  The skin lesion was marked and infiltrated with local anesthesia to effect.  The excision site was prepared and draped in sterile fashion. The skin lesion was excised with the indicated margins in the usual fashion and sent to pathology for review.  Surgical wounds were made hemostatic and repaired as follows:  Site: left paraspinal back Cyst width (with margins):  2.3 cm Closure complexity and length: 2.1 cm, intermediate (3-0 monocryl DD and running subcuticular)  Steristrips, benzoin, telfa,tegaderm.

## 2024-03-18 NOTE — PHYSICAL EXAM
[NI] : Normal [de-identified] : left mid-paraspinal subcutaneous cyst, 1.8 cm, no active drainage or erythema [de-identified] : left medial central thigh 7 cm soft mobile mass, nontender

## 2024-03-20 ENCOUNTER — NON-APPOINTMENT (OUTPATIENT)
Age: 64
End: 2024-03-20

## 2024-03-22 LAB — CORE LAB BIOPSY: NORMAL

## 2024-04-01 ENCOUNTER — APPOINTMENT (OUTPATIENT)
Dept: PLASTIC SURGERY | Facility: CLINIC | Age: 64
End: 2024-04-01
Payer: MEDICARE

## 2024-04-01 DIAGNOSIS — L72.0 EPIDERMAL CYST: ICD-10-CM

## 2024-04-01 PROCEDURE — 99212 OFFICE O/P EST SF 10 MIN: CPT

## 2024-04-01 NOTE — ASSESSMENT
[FreeTextEntry1] : 64 yo with PMHx GERD, arthritis, fibromyalgia, RSD/CRPS of spine, headache, HTN, OA, hyperthyroidism with long-standing back cyst and left medial thigh mass c/w lipoma on clinical exam  Now 2 weeks s/p left paraspinal back cyst excision under local anesthesia. Doing well.   -Suture tails removed, steri strip applied -May shower - Daily Aquaphor -Pathology discussed, benign, report given to pt -All her questions were answered  -Follow up PRN

## 2024-04-01 NOTE — HISTORY OF PRESENT ILLNESS
[FreeTextEntry1] : 64 yo with PMHx GERD, arthritis, fibromyalgia, RSD/CRPS of spine, headache, HTN, OA, hyperthyroidism referred by [facility] with 12-15 years back cyst, h/o intermittent drainage.  Denies fevers, chills Also complaint of left medial thigh bump x several decades  No family hx of skin cancer. No personal hx of skin cancer. No ASA use  Here to discuss treatment options.  Interval hx (3/18/24):  Here for back cyst excision.    Interval hx (4/1/24). Pt here 2 weeks s/p excision of lower back cyst. Doing well with no significant pain, f/c or drainage.

## 2024-04-01 NOTE — DATA REVIEWED
[FreeTextEntry1] : Patient:     ABILIO SAENZ   Accession:                             [id number]  Collected Date/Time:                   3/18/2024 15:35 EDT Received Date/Time:                    3/19/2024 09:20 EDT  Surgical Pathology Report - Auth (Verified)  Specimen(s) Submitted Left paraspinal back cyst  Final Diagnosis  Left paraspinal back cyst, excision:     - Epidermal inclusion cyst.  Verified by: Thom RiceDedrasaulo William MD (Electronic Signature) Reported on: 03/22/24 20:05 EDT, [facility], [address] [place] [address] Phone: [phone]   Fax: [phone]

## 2024-04-01 NOTE — PHYSICAL EXAM
[NI] : Normal [de-identified] : NAD [de-identified] : left mid-paraspinal incision healing well, c/d/i, no erythema, minimal swelling and bruising, no fluid collection  [de-identified] : left medial central thigh 7 cm soft mobile mass, nontender

## 2024-04-22 NOTE — ASU DISCHARGE PLAN (ADULT/PEDIATRIC) - DATE OF FIRST COVID-19 BOOSTER
Blood work today and q 6 weeks(labs today and call me with creatinin  Rtc in 6 weeks with bone density scan   04-Nov-2021

## 2024-06-18 ENCOUNTER — APPOINTMENT (OUTPATIENT)
Dept: CARDIOLOGY | Facility: CLINIC | Age: 64
End: 2024-06-18

## 2024-07-03 ENCOUNTER — APPOINTMENT (OUTPATIENT)
Dept: CARDIOLOGY | Facility: CLINIC | Age: 64
End: 2024-07-03
Payer: MEDICARE

## 2024-07-03 VITALS
WEIGHT: 135 LBS | DIASTOLIC BLOOD PRESSURE: 80 MMHG | HEART RATE: 65 BPM | BODY MASS INDEX: 24.84 KG/M2 | HEIGHT: 62 IN | SYSTOLIC BLOOD PRESSURE: 140 MMHG

## 2024-07-03 DIAGNOSIS — I10 ESSENTIAL (PRIMARY) HYPERTENSION: ICD-10-CM

## 2024-07-03 DIAGNOSIS — R00.2 PALPITATIONS: ICD-10-CM

## 2024-07-03 PROCEDURE — 99213 OFFICE O/P EST LOW 20 MIN: CPT

## 2024-07-03 PROCEDURE — G2211 COMPLEX E/M VISIT ADD ON: CPT

## 2024-07-03 PROCEDURE — 93000 ELECTROCARDIOGRAM COMPLETE: CPT

## 2024-12-16 ENCOUNTER — APPOINTMENT (OUTPATIENT)
Dept: OBGYN | Facility: CLINIC | Age: 64
End: 2024-12-16

## 2025-01-08 ENCOUNTER — NON-APPOINTMENT (OUTPATIENT)
Age: 65
End: 2025-01-08

## 2025-02-27 ENCOUNTER — NON-APPOINTMENT (OUTPATIENT)
Age: 65
End: 2025-02-27

## 2025-02-27 ENCOUNTER — APPOINTMENT (OUTPATIENT)
Dept: CARDIOLOGY | Facility: CLINIC | Age: 65
End: 2025-02-27
Payer: MEDICARE

## 2025-02-27 VITALS
BODY MASS INDEX: 23.74 KG/M2 | HEART RATE: 47 BPM | WEIGHT: 129 LBS | HEIGHT: 62 IN | DIASTOLIC BLOOD PRESSURE: 62 MMHG | SYSTOLIC BLOOD PRESSURE: 124 MMHG

## 2025-02-27 DIAGNOSIS — R00.1 BRADYCARDIA, UNSPECIFIED: ICD-10-CM

## 2025-02-27 DIAGNOSIS — R00.2 PALPITATIONS: ICD-10-CM

## 2025-02-27 DIAGNOSIS — E78.2 MIXED HYPERLIPIDEMIA: ICD-10-CM

## 2025-02-27 DIAGNOSIS — I10 ESSENTIAL (PRIMARY) HYPERTENSION: ICD-10-CM

## 2025-02-27 PROCEDURE — 93000 ELECTROCARDIOGRAM COMPLETE: CPT

## 2025-02-27 PROCEDURE — 99213 OFFICE O/P EST LOW 20 MIN: CPT | Mod: 25

## 2025-02-27 RX ORDER — OMEPRAZOLE 40 MG/1
40 CAPSULE, DELAYED RELEASE ORAL
Refills: 0 | Status: ACTIVE | COMMUNITY

## 2025-02-27 RX ORDER — DENOSUMAB 60 MG/ML
INJECTION SUBCUTANEOUS
Refills: 0 | Status: ACTIVE | COMMUNITY

## 2025-08-28 ENCOUNTER — APPOINTMENT (OUTPATIENT)
Dept: CARDIOLOGY | Facility: CLINIC | Age: 65
End: 2025-08-28
Payer: MEDICARE

## 2025-08-28 VITALS
WEIGHT: 125 LBS | BODY MASS INDEX: 23 KG/M2 | HEIGHT: 62 IN | DIASTOLIC BLOOD PRESSURE: 76 MMHG | SYSTOLIC BLOOD PRESSURE: 122 MMHG | HEART RATE: 55 BPM

## 2025-08-28 DIAGNOSIS — E78.2 MIXED HYPERLIPIDEMIA: ICD-10-CM

## 2025-08-28 DIAGNOSIS — I10 ESSENTIAL (PRIMARY) HYPERTENSION: ICD-10-CM

## 2025-08-28 DIAGNOSIS — R00.1 BRADYCARDIA, UNSPECIFIED: ICD-10-CM

## 2025-08-28 PROCEDURE — 93000 ELECTROCARDIOGRAM COMPLETE: CPT

## 2025-08-28 PROCEDURE — 99213 OFFICE O/P EST LOW 20 MIN: CPT | Mod: 25
